# Patient Record
Sex: MALE | Race: WHITE | NOT HISPANIC OR LATINO | Employment: FULL TIME | URBAN - METROPOLITAN AREA
[De-identification: names, ages, dates, MRNs, and addresses within clinical notes are randomized per-mention and may not be internally consistent; named-entity substitution may affect disease eponyms.]

---

## 2022-06-02 ENCOUNTER — OFFICE VISIT (OUTPATIENT)
Dept: URGENT CARE | Facility: URGENT CARE | Age: 32
End: 2022-06-02
Payer: COMMERCIAL

## 2022-06-02 VITALS
OXYGEN SATURATION: 100 % | SYSTOLIC BLOOD PRESSURE: 142 MMHG | DIASTOLIC BLOOD PRESSURE: 79 MMHG | HEART RATE: 64 BPM | RESPIRATION RATE: 12 BRPM | WEIGHT: 178 LBS | TEMPERATURE: 98.6 F

## 2022-06-02 DIAGNOSIS — Z11.3 SCREEN FOR STD (SEXUALLY TRANSMITTED DISEASE): ICD-10-CM

## 2022-06-02 DIAGNOSIS — I89.1 LYMPHANGITIS OF GROIN: Primary | ICD-10-CM

## 2022-06-02 LAB
BASOPHILS # BLD AUTO: 0 10E3/UL (ref 0–0.2)
BASOPHILS NFR BLD AUTO: 0 %
EOSINOPHIL # BLD AUTO: 0 10E3/UL (ref 0–0.7)
EOSINOPHIL NFR BLD AUTO: 0 %
ERYTHROCYTE [DISTWIDTH] IN BLOOD BY AUTOMATED COUNT: 13.3 % (ref 10–15)
HCT VFR BLD AUTO: 44.9 % (ref 40–53)
HGB BLD-MCNC: 15.1 G/DL (ref 13.3–17.7)
LYMPHOCYTES # BLD AUTO: 1.3 10E3/UL (ref 0.8–5.3)
LYMPHOCYTES NFR BLD AUTO: 16 %
MCH RBC QN AUTO: 29.4 PG (ref 26.5–33)
MCHC RBC AUTO-ENTMCNC: 33.6 G/DL (ref 31.5–36.5)
MCV RBC AUTO: 88 FL (ref 78–100)
MONOCYTES # BLD AUTO: 0.6 10E3/UL (ref 0–1.3)
MONOCYTES NFR BLD AUTO: 7 %
NEUTROPHILS # BLD AUTO: 6.3 10E3/UL (ref 1.6–8.3)
NEUTROPHILS NFR BLD AUTO: 77 %
PLATELET # BLD AUTO: 224 10E3/UL (ref 150–450)
RBC # BLD AUTO: 5.13 10E6/UL (ref 4.4–5.9)
WBC # BLD AUTO: 8.2 10E3/UL (ref 4–11)

## 2022-06-02 PROCEDURE — 36415 COLL VENOUS BLD VENIPUNCTURE: CPT | Performed by: NURSE PRACTITIONER

## 2022-06-02 PROCEDURE — 86696 HERPES SIMPLEX TYPE 2 TEST: CPT | Performed by: NURSE PRACTITIONER

## 2022-06-02 PROCEDURE — 86780 TREPONEMA PALLIDUM: CPT | Performed by: NURSE PRACTITIONER

## 2022-06-02 PROCEDURE — 87389 HIV-1 AG W/HIV-1&-2 AB AG IA: CPT | Performed by: NURSE PRACTITIONER

## 2022-06-02 PROCEDURE — 96372 THER/PROPH/DIAG INJ SC/IM: CPT | Performed by: NURSE PRACTITIONER

## 2022-06-02 PROCEDURE — 99204 OFFICE O/P NEW MOD 45 MIN: CPT | Mod: 25 | Performed by: NURSE PRACTITIONER

## 2022-06-02 PROCEDURE — 85025 COMPLETE CBC W/AUTO DIFF WBC: CPT | Performed by: NURSE PRACTITIONER

## 2022-06-02 PROCEDURE — 87591 N.GONORRHOEAE DNA AMP PROB: CPT | Performed by: NURSE PRACTITIONER

## 2022-06-02 PROCEDURE — 86695 HERPES SIMPLEX TYPE 1 TEST: CPT | Performed by: NURSE PRACTITIONER

## 2022-06-02 PROCEDURE — 87491 CHLMYD TRACH DNA AMP PROBE: CPT | Performed by: NURSE PRACTITIONER

## 2022-06-02 RX ORDER — CEFTRIAXONE SODIUM 1 G
1 VIAL (EA) INJECTION ONCE
Status: COMPLETED | OUTPATIENT
Start: 2022-06-02 | End: 2022-06-02

## 2022-06-02 RX ORDER — DOXYCYCLINE 100 MG/1
100 CAPSULE ORAL 2 TIMES DAILY
Qty: 20 CAPSULE | Refills: 0 | Status: SHIPPED | OUTPATIENT
Start: 2022-06-02

## 2022-06-02 RX ADMIN — Medication 1 G: at 15:45

## 2022-06-02 NOTE — PROGRESS NOTES
Assessment & Plan      Diagnosis Comments   1. Lymphangitis of groin  CBC with platelets and differential, doxycycline hyclate (VIBRAMYCIN) 100 MG capsule, cefTRIAXone (ROCEPHIN) injection 1 g    2. Screen for STD (sexually transmitted disease)  NEISSERIA GONORRHOEA PCR, CHLAMYDIA TRACHOMATIS PCR, HIV Antigen Antibody Combo, Treponema Abs w Reflex to RPR and Titer, Herpes Simplex Virus 1 and 2 IgG      Recommend patient follow-up closely same day appointment made for following day with provider would recommend ultrasound we were unable to obtain this today.  We will start on oral antibiotic and Rocephin given in clinic with labs as ordered.    Differential diagnosis would include epididymitis, did not appear to be torsion, however reviewed symptoms of this and instructed to go to ER emergency department if occurs.  Additional would be inguinal hernia.    JORGE Pineda [facility] [place]    Kristi Abel is a 32 year old male who presents to clinic today for the following health issues:  Chief Complaint   Patient presents with     Groin Pain     Right sided groin pain. Right side of penis is inflamed. Sx for 2 days. Was lifting heavy things the other day     HPI    Patient presents to clinic with 2 day history of pain right inguinal area notes achy not sharp or stabbing pain states redness runs from right groin to side of right penis with mild tenderness in this area.. Denies issues with urination or BM.  Two new recent partners with unprotected sexual encounters.        Review of Systems  Constitutional, HEENT, cardiovascular, pulmonary, gi and gu systems are negative, except as otherwise noted.      Objective    BP (!) 142/79   Pulse 64   Temp 98.6  F (37  C) (Tympanic)   Resp 12   Wt 80.7 kg (178 lb)   SpO2 100%   Physical Exam   GENERAL: healthy, alert and no distress  NECK: no adenopathy, no asymmetry, masses, or scars and thyroid normal to palpation  RESP: lungs  clear to auscultation - no rales, rhonchi or wheezes  CV: regular rate and rhythm, normal S1 S2, no S3 or S4, no murmur, click or rub, no peripheral edema and peripheral pulses strong  ABDOMEN: soft, nontender, no hepatosplenomegaly, no masses and bowel sounds normal   (male): testicles normal without atrophy or masses, no hernias, penis normal without urethral discharge, tenderness to palpation right side of penis small dry flat abrasion linear approximately 1 inch in length erythema present negative for drainage mild inflammation of this area   MS: no gross musculoskeletal defects noted, no edema  SKIN: Right inguinal erythema tenderness to touch inflammation noted

## 2022-06-02 NOTE — PROGRESS NOTES
Assessment & Plan       ICD-10-CM    1. Sore of penis  N48.89    2. Inguinal lymphadenopathy  R59.0      I talked to patient about his concerns.  Unclear etiology at this time.  Labs are pending.  Possible cellulitis versus HSV and infection.  It appears that is improving per patient.  We will continue to monitor this follow-up depend on lab results.  Encouraged to follow-up in 1 week for recheck once he is done with his antibiotics.  Warning signs were discussed.    Return in about 1 week (around 6/10/2022) for recheck.    KERRI Coleman [facility]    Kristi Abel is a 32 year old who presents for the following health issues  accompanied by his self.    History of Present Illness       Reason for visit:  Groin pain    He eats 2-3 servings of fruits and vegetables daily.He consumes 1 sweetened beverage(s) daily.He exercises with enough effort to increase his heart rate 60 or more minutes per day.  He exercises with enough effort to increase his heart rate 4 days per week.   He is taking medications regularly.  He noticed a sore on the right shaft of his penis couple days ago.  He states he had some like this about 4 years ago.  He does not recall any treatment at that time.  He has noticed some swelling and redness and some swollen lymph nodes in his right groin region.  Was see in urgent care.  He was given some antibiotics yesterday and is still taking them up.  He denies any left-sided symptoms.  No new known exposures to STDs but labs are pending    Review of Systems   Constitutional, HEENT, cardiovascular, pulmonary, gi and gu systems are negative, except as otherwise noted.      Objective    /76   Pulse 95   Temp (!) 96.4  F (35.8  C) (Tympanic)   Resp 18   Ht 1.829 m (6')   Wt 79.8 kg (176 lb)   SpO2 100%   BMI 23.87 kg/m    Body mass index is 23.87 kg/m .  Physical Exam   GENERAL: healthy, alert and no distress  RESP: lungs clear to auscultation - no  rales, rhonchi or wheezes  CV: regular rate and rhythm, normal S1 S2, no S3 or S4, no murmur, click or rub, no peripheral edema and peripheral pulses strong  ABDOMEN: soft, nontender, no hepatosplenomegaly, no masses and bowel sounds normal   (male): normal male genitalia with small right scab lesion with surrounding erythema no urethral discharge. Right > left inguinal lymphadnopathy.   MS: no gross musculoskeletal defects noted, no edema

## 2022-06-03 ENCOUNTER — OFFICE VISIT (OUTPATIENT)
Dept: FAMILY MEDICINE | Facility: CLINIC | Age: 32
End: 2022-06-03
Payer: COMMERCIAL

## 2022-06-03 VITALS
WEIGHT: 176 LBS | SYSTOLIC BLOOD PRESSURE: 121 MMHG | BODY MASS INDEX: 23.84 KG/M2 | HEART RATE: 95 BPM | DIASTOLIC BLOOD PRESSURE: 76 MMHG | TEMPERATURE: 96.4 F | RESPIRATION RATE: 18 BRPM | OXYGEN SATURATION: 100 % | HEIGHT: 72 IN

## 2022-06-03 DIAGNOSIS — R59.0 INGUINAL LYMPHADENOPATHY: ICD-10-CM

## 2022-06-03 DIAGNOSIS — N48.89 SORE OF PENIS: Primary | ICD-10-CM

## 2022-06-03 LAB
C TRACH DNA SPEC QL NAA+PROBE: NEGATIVE
HIV 1+2 AB+HIV1 P24 AG SERPL QL IA: NONREACTIVE
N GONORRHOEA DNA SPEC QL NAA+PROBE: NEGATIVE
T PALLIDUM AB SER QL: NONREACTIVE

## 2022-06-03 PROCEDURE — 99213 OFFICE O/P EST LOW 20 MIN: CPT | Performed by: PHYSICIAN ASSISTANT

## 2022-06-03 NOTE — RESULT ENCOUNTER NOTE
Results discussed with patient in clinic. States understanding of these results.    Patricia Anderson CNP

## 2022-06-05 ENCOUNTER — TELEPHONE (OUTPATIENT)
Dept: URGENT CARE | Facility: URGENT CARE | Age: 32
End: 2022-06-05
Payer: COMMERCIAL

## 2022-06-05 DIAGNOSIS — A60.02 HERPES GENITALIS IN MEN: Primary | ICD-10-CM

## 2022-06-05 LAB
HSV1 IGG SERPL QL IA: 8.06 INDEX
HSV1 IGG SERPL QL IA: ABNORMAL
HSV2 IGG SERPL QL IA: 7.56 INDEX
HSV2 IGG SERPL QL IA: ABNORMAL

## 2022-06-05 RX ORDER — VALACYCLOVIR HYDROCHLORIDE 1 G/1
1000 TABLET, FILM COATED ORAL 2 TIMES DAILY
Qty: 20 TABLET | Refills: 0 | Status: SHIPPED | OUTPATIENT
Start: 2022-06-05 | End: 2022-06-15

## 2022-06-05 NOTE — TELEPHONE ENCOUNTER
Called and informed patient positive for herpes on serology  This does not necessarily confirm the diagnosis of active outbreak - I did not see patient myself so difficult for me to confirm as well.  Patient states that it feels like sores   Will empirically treat  Patient advised to inform sexual contacts and also to establish care with a primary care provider to answer all his additional questions with sexual health and preventative care.  Patient voiced understanding  He states he is currently single - but he will inform his recent sexual contacts  Leisa Krishnamurthy M.D.